# Patient Record
Sex: MALE | Race: BLACK OR AFRICAN AMERICAN | NOT HISPANIC OR LATINO | URBAN - METROPOLITAN AREA
[De-identification: names, ages, dates, MRNs, and addresses within clinical notes are randomized per-mention and may not be internally consistent; named-entity substitution may affect disease eponyms.]

---

## 2023-05-18 ENCOUNTER — INPATIENT (INPATIENT)
Facility: HOSPITAL | Age: 63
LOS: 2 days | Discharge: HOME | DRG: 65 | End: 2023-05-21
Attending: HOSPITALIST | Admitting: INTERNAL MEDICINE
Payer: COMMERCIAL

## 2023-05-18 VITALS
DIASTOLIC BLOOD PRESSURE: 99 MMHG | HEIGHT: 65 IN | TEMPERATURE: 99 F | HEART RATE: 95 BPM | RESPIRATION RATE: 18 BRPM | SYSTOLIC BLOOD PRESSURE: 155 MMHG | OXYGEN SATURATION: 96 % | WEIGHT: 181 LBS

## 2023-05-18 DIAGNOSIS — R29.810 FACIAL WEAKNESS: ICD-10-CM

## 2023-05-18 LAB
ALBUMIN SERPL ELPH-MCNC: 4.6 G/DL — SIGNIFICANT CHANGE UP (ref 3.3–5.2)
ALP SERPL-CCNC: 58 U/L — SIGNIFICANT CHANGE UP (ref 40–120)
ALT FLD-CCNC: 55 U/L — HIGH
ANION GAP SERPL CALC-SCNC: 12 MMOL/L — SIGNIFICANT CHANGE UP (ref 5–17)
APPEARANCE UR: CLEAR — SIGNIFICANT CHANGE UP
AST SERPL-CCNC: 56 U/L — HIGH
BASOPHILS # BLD AUTO: 0.01 K/UL — SIGNIFICANT CHANGE UP (ref 0–0.2)
BASOPHILS NFR BLD AUTO: 0.2 % — SIGNIFICANT CHANGE UP (ref 0–2)
BILIRUB SERPL-MCNC: 0.5 MG/DL — SIGNIFICANT CHANGE UP (ref 0.4–2)
BILIRUB UR-MCNC: NEGATIVE — SIGNIFICANT CHANGE UP
BLD GP AB SCN SERPL QL: SIGNIFICANT CHANGE UP
BUN SERPL-MCNC: 9.1 MG/DL — SIGNIFICANT CHANGE UP (ref 8–20)
CALCIUM SERPL-MCNC: 9.7 MG/DL — SIGNIFICANT CHANGE UP (ref 8.4–10.5)
CHLORIDE SERPL-SCNC: 99 MMOL/L — SIGNIFICANT CHANGE UP (ref 96–108)
CO2 SERPL-SCNC: 24 MMOL/L — SIGNIFICANT CHANGE UP (ref 22–29)
COLOR SPEC: YELLOW — SIGNIFICANT CHANGE UP
CREAT SERPL-MCNC: 0.91 MG/DL — SIGNIFICANT CHANGE UP (ref 0.5–1.3)
DIFF PNL FLD: NEGATIVE — SIGNIFICANT CHANGE UP
EGFR: 95 ML/MIN/1.73M2 — SIGNIFICANT CHANGE UP
EOSINOPHIL # BLD AUTO: 0.04 K/UL — SIGNIFICANT CHANGE UP (ref 0–0.5)
EOSINOPHIL NFR BLD AUTO: 0.9 % — SIGNIFICANT CHANGE UP (ref 0–6)
ERYTHROCYTE [SEDIMENTATION RATE] IN BLOOD: 20 MM/HR — SIGNIFICANT CHANGE UP (ref 0–20)
ETHANOL SERPL-MCNC: <10 MG/DL — SIGNIFICANT CHANGE UP (ref 0–9)
GLUCOSE SERPL-MCNC: 119 MG/DL — HIGH (ref 70–99)
GLUCOSE UR QL: NEGATIVE MG/DL — SIGNIFICANT CHANGE UP
HCT VFR BLD CALC: 40 % — SIGNIFICANT CHANGE UP (ref 39–50)
HGB BLD-MCNC: 13.8 G/DL — SIGNIFICANT CHANGE UP (ref 13–17)
IMM GRANULOCYTES NFR BLD AUTO: 0.2 % — SIGNIFICANT CHANGE UP (ref 0–0.9)
KETONES UR-MCNC: NEGATIVE — SIGNIFICANT CHANGE UP
LEUKOCYTE ESTERASE UR-ACNC: ABNORMAL
LYMPHOCYTES # BLD AUTO: 1.06 K/UL — SIGNIFICANT CHANGE UP (ref 1–3.3)
LYMPHOCYTES # BLD AUTO: 23.1 % — SIGNIFICANT CHANGE UP (ref 13–44)
MCHC RBC-ENTMCNC: 30.8 PG — SIGNIFICANT CHANGE UP (ref 27–34)
MCHC RBC-ENTMCNC: 34.5 GM/DL — SIGNIFICANT CHANGE UP (ref 32–36)
MCV RBC AUTO: 89.3 FL — SIGNIFICANT CHANGE UP (ref 80–100)
MONOCYTES # BLD AUTO: 0.41 K/UL — SIGNIFICANT CHANGE UP (ref 0–0.9)
MONOCYTES NFR BLD AUTO: 8.9 % — SIGNIFICANT CHANGE UP (ref 2–14)
NEUTROPHILS # BLD AUTO: 3.06 K/UL — SIGNIFICANT CHANGE UP (ref 1.8–7.4)
NEUTROPHILS NFR BLD AUTO: 66.7 % — SIGNIFICANT CHANGE UP (ref 43–77)
NITRITE UR-MCNC: NEGATIVE — SIGNIFICANT CHANGE UP
PH UR: 6.5 — SIGNIFICANT CHANGE UP (ref 5–8)
PLATELET # BLD AUTO: 203 K/UL — SIGNIFICANT CHANGE UP (ref 150–400)
POTASSIUM SERPL-MCNC: 3.8 MMOL/L — SIGNIFICANT CHANGE UP (ref 3.5–5.3)
POTASSIUM SERPL-SCNC: 3.8 MMOL/L — SIGNIFICANT CHANGE UP (ref 3.5–5.3)
PROT SERPL-MCNC: 8.2 G/DL — SIGNIFICANT CHANGE UP (ref 6.6–8.7)
PROT UR-MCNC: NEGATIVE — SIGNIFICANT CHANGE UP
RBC # BLD: 4.48 M/UL — SIGNIFICANT CHANGE UP (ref 4.2–5.8)
RBC # FLD: 11.4 % — SIGNIFICANT CHANGE UP (ref 10.3–14.5)
RBC CASTS # UR COMP ASSIST: NEGATIVE /HPF — SIGNIFICANT CHANGE UP (ref 0–4)
SODIUM SERPL-SCNC: 135 MMOL/L — SIGNIFICANT CHANGE UP (ref 135–145)
SP GR SPEC: 1.01 — SIGNIFICANT CHANGE UP (ref 1.01–1.02)
TROPONIN T SERPL-MCNC: <0.01 NG/ML — SIGNIFICANT CHANGE UP (ref 0–0.06)
UROBILINOGEN FLD QL: NEGATIVE MG/DL — SIGNIFICANT CHANGE UP
WBC # BLD: 4.59 K/UL — SIGNIFICANT CHANGE UP (ref 3.8–10.5)
WBC # FLD AUTO: 4.59 K/UL — SIGNIFICANT CHANGE UP (ref 3.8–10.5)
WBC UR QL: SIGNIFICANT CHANGE UP /HPF (ref 0–5)

## 2023-05-18 PROCEDURE — 72125 CT NECK SPINE W/O DYE: CPT | Mod: 26,MG

## 2023-05-18 PROCEDURE — 99223 1ST HOSP IP/OBS HIGH 75: CPT

## 2023-05-18 PROCEDURE — 70450 CT HEAD/BRAIN W/O DYE: CPT | Mod: 26,MG

## 2023-05-18 PROCEDURE — 93010 ELECTROCARDIOGRAM REPORT: CPT

## 2023-05-18 PROCEDURE — 93306 TTE W/DOPPLER COMPLETE: CPT | Mod: 26

## 2023-05-18 PROCEDURE — G1004: CPT

## 2023-05-18 PROCEDURE — 99285 EMERGENCY DEPT VISIT HI MDM: CPT

## 2023-05-18 PROCEDURE — 71045 X-RAY EXAM CHEST 1 VIEW: CPT | Mod: 26

## 2023-05-18 RX ORDER — ASPIRIN/CALCIUM CARB/MAGNESIUM 324 MG
81 TABLET ORAL DAILY
Refills: 0 | Status: DISCONTINUED | OUTPATIENT
Start: 2023-05-18 | End: 2023-05-21

## 2023-05-18 RX ORDER — THIAMINE MONONITRATE (VIT B1) 100 MG
100 TABLET ORAL DAILY
Refills: 0 | Status: DISCONTINUED | OUTPATIENT
Start: 2023-05-18 | End: 2023-05-21

## 2023-05-18 RX ORDER — ENOXAPARIN SODIUM 100 MG/ML
40 INJECTION SUBCUTANEOUS EVERY 24 HOURS
Refills: 0 | Status: DISCONTINUED | OUTPATIENT
Start: 2023-05-18 | End: 2023-05-21

## 2023-05-18 RX ORDER — MULTIVIT-MIN/FERROUS GLUCONATE 9 MG/15 ML
1 LIQUID (ML) ORAL DAILY
Refills: 0 | Status: DISCONTINUED | OUTPATIENT
Start: 2023-05-18 | End: 2023-05-21

## 2023-05-18 RX ORDER — ACETAMINOPHEN 500 MG
650 TABLET ORAL EVERY 6 HOURS
Refills: 0 | Status: DISCONTINUED | OUTPATIENT
Start: 2023-05-18 | End: 2023-05-21

## 2023-05-18 RX ORDER — ATORVASTATIN CALCIUM 80 MG/1
80 TABLET, FILM COATED ORAL AT BEDTIME
Refills: 0 | Status: DISCONTINUED | OUTPATIENT
Start: 2023-05-18 | End: 2023-05-21

## 2023-05-18 RX ORDER — FOLIC ACID 0.8 MG
1 TABLET ORAL DAILY
Refills: 0 | Status: DISCONTINUED | OUTPATIENT
Start: 2023-05-18 | End: 2023-05-21

## 2023-05-18 RX ADMIN — ENOXAPARIN SODIUM 40 MILLIGRAM(S): 100 INJECTION SUBCUTANEOUS at 18:29

## 2023-05-18 RX ADMIN — ATORVASTATIN CALCIUM 80 MILLIGRAM(S): 80 TABLET, FILM COATED ORAL at 21:33

## 2023-05-18 NOTE — ED PROVIDER NOTE - CLINICAL SUMMARY MEDICAL DECISION MAKING FREE TEXT BOX
62yoM; with PMH signif for Alcohol Abuse, TBI; now p/w new onset slurred speech and facial droop.  patient reports he awoke normal yesterday.  yesterday at work, someone stated he was not speaking well and face was drooped.  Stroke assessment found patient to have NIHSS=3.  ct with signs of ischemia. will admit for further w/up.

## 2023-05-18 NOTE — ED PROVIDER NOTE - OBJECTIVE STATEMENT
62yoM; with PMH signif for Alcohol Abuse, TBI; now p/w new onset slurred speech and facial droop.  patient reports he awoke normal yesterday.  yesterday at work, someone stated he was not speaking well and face was drooped.  patient denies headache. denies n/v. denies f/c/s.  denies cp/sob/palp. denies back pain.,    PMH: Alcohol Abuse, TBI  SOCIAL:+EtOH 6 beers daily

## 2023-05-18 NOTE — ED ADULT NURSE NOTE - OBJECTIVE STATEMENT
Pt with slurred speech and left sided facial droop that started more than 24hrs ago. Pt denies any other complaints.

## 2023-05-18 NOTE — ED ADULT TRIAGE NOTE - CHIEF COMPLAINT QUOTE
Pt LAURITA from doctors office, sent to ED for facial droop and slurred speech, onset 11am yesterday, no hx of stroke, pt offers no other complaints

## 2023-05-18 NOTE — DISCHARGE NOTE NURSING/CASE MANAGEMENT/SOCIAL WORK - NSDCPEFALRISK_GEN_ALL_CORE
For information on Fall & Injury Prevention, visit: [email or web address] OR  [email or web address] OR  https://www.cdc.gov/steadi/patient.html no...

## 2023-05-18 NOTE — PATIENT PROFILE ADULT - FALL HARM RISK - HARM RISK INTERVENTIONS
Assistance with ambulation/Assistance OOB with selected safe patient handling equipment/Communicate Risk of Fall with Harm to all staff/Discuss with provider need for PT consult/Monitor gait and stability/Reinforce activity limits and safety measures with patient and family/Tailored Fall Risk Interventions/Visual Cue: Yellow wristband and red socks/Bed in lowest position, wheels locked, appropriate side rails in place/Call bell, personal items and telephone in reach/Instruct patient to call for assistance before getting out of bed or chair/Non-slip footwear when patient is out of bed/[place] to call system/Physically safe environment - no spills, clutter or unnecessary equipment/Purposeful Proactive Rounding/Room/bathroom lighting operational, light cord in reach

## 2023-05-18 NOTE — ED PROVIDER NOTE - NS ED ROS FT
Constitutional: (-) fever  (-)chills  (-)sweats  Eyes/ENT: (-)   Cardiovascular: (-) chest pain, (-) palpitations (-) edema   Respiratory: (-) cough, (-) shortness of breath   Gastrointestinal: (-)nausea  (-)vomiting, (-) diarrhea  (-) abdominal pain   :  (-)dysuria, (-)frequency, (-)urgency, (-)hematuria  Musculoskeletal: (-) neck pain, (-) back pain, (-) joint pain  Integumentary: (-) rash, (-) edema  Neurological: (-) headache, (-) altered mental status  (-)LOC  +slurred speech +facial droop

## 2023-05-18 NOTE — ED ADULT NURSE NOTE - NSFALLUNIVINTERV_ED_ALL_ED
Bed/Stretcher in lowest position, wheels locked, appropriate side rails in place/Call bell, personal items and telephone in reach/Instruct patient to call for assistance before getting out of bed/chair/stretcher/Non-slip footwear applied when patient is off stretcher/[place] to call system/Physically safe environment - no spills, clutter or unnecessary equipment/Purposeful proactive rounding/Room/bathroom lighting operational, light cord in reach

## 2023-05-18 NOTE — ED PROVIDER NOTE - PHYSICAL EXAMINATION
Gen: Alert, NAD  Head: NC, AT, PERRL, EOMI, normal lids/conjunctiva  Neck: +supple, no tenderness/meningismus/JVD, +Trachea midline  Pulm: Bilateral BS, normal resp effort, no wheeze/stridor/retractions  CV: RRR, no M/R/G, 2+dist pulses  Abd: soft, NT/ND, +BS, no hepatosplenomegaly  Mskel: ROM intact x4 extremities.  no edema/erythema/cyanosis  Skin: no rash, warm, dry  Neuro: AAOx3, L facial droop, slurred speech, L UE drift (NIHSS=3)

## 2023-05-18 NOTE — H&P ADULT - ASSESSMENT
63 yo M w/ hx TBI, etoh use presents to ER for slurred speech and facial droop since wednesday at 11am.  States he lives in [place] and comes to [place] for seasonal work.  At work, coworker noticed his symptoms and was seen by MD at private office with Er referral. Last drink 2 days ago with rum. usually consumes 1-2 drinks, sometimes to help with insomnia. denies hx of withdrawals, DTs or heavy etoh use. Er workup negative thus far for acute pathology but requesting admission for MRI to r/o CVA    slurred speech/ facial droop    r/o CVA    MRI pending     neurology consulted    aspirin/statin    pt/ot/ speech    etoh abuse: ciwa, thiamine for suspect def   folate/MVI    transaminitis: trend    ppx: lovenox

## 2023-05-18 NOTE — DISCHARGE NOTE NURSING/CASE MANAGEMENT/SOCIAL WORK - PATIENT PORTAL LINK FT
You can access the FollowMyHealth Patient Portal offered by [facility] by registering at the following website: [email or web address]. By joining [employer]’s FollowMyHealth portal, you will also be able to view your health information using other applications (apps) compatible with our system.

## 2023-05-18 NOTE — H&P ADULT - HISTORY OF PRESENT ILLNESS
61 yo M w/ hx TBI, etoh use presents to ER for slurred speech and facial droop since wednesday at 11am.  States he lives in [place] and comes to [place] for seasonal work.  At work, coworker noticed his symptoms and was seen by MD at private office with Er referral. Last drink 2 days ago with rum. usually consumes 1-2 drinks, sometimes to help with insomnia. denies hx of withdrawals, DTs or heavy etoh use. Er workup negative thus far for acute pathology but requesting admission for MRI to r/o CVA

## 2023-05-19 LAB
A1C WITH ESTIMATED AVERAGE GLUCOSE RESULT: 5.5 % — SIGNIFICANT CHANGE UP (ref 4–5.6)
ANION GAP SERPL CALC-SCNC: 13 MMOL/L — SIGNIFICANT CHANGE UP (ref 5–17)
BUN SERPL-MCNC: 14.1 MG/DL — SIGNIFICANT CHANGE UP (ref 8–20)
CALCIUM SERPL-MCNC: 9.4 MG/DL — SIGNIFICANT CHANGE UP (ref 8.4–10.5)
CHLORIDE SERPL-SCNC: 100 MMOL/L — SIGNIFICANT CHANGE UP (ref 96–108)
CHOLEST SERPL-MCNC: 216 MG/DL — HIGH
CO2 SERPL-SCNC: 22 MMOL/L — SIGNIFICANT CHANGE UP (ref 22–29)
CREAT SERPL-MCNC: 0.88 MG/DL — SIGNIFICANT CHANGE UP (ref 0.5–1.3)
CULTURE RESULTS: SIGNIFICANT CHANGE UP
EGFR: 97 ML/MIN/1.73M2 — SIGNIFICANT CHANGE UP
ESTIMATED AVERAGE GLUCOSE: 111 MG/DL — SIGNIFICANT CHANGE UP (ref 68–114)
GLUCOSE SERPL-MCNC: 118 MG/DL — HIGH (ref 70–99)
HCV AB S/CO SERPL IA: 0.21 S/CO — SIGNIFICANT CHANGE UP (ref 0–0.99)
HCV AB SERPL-IMP: SIGNIFICANT CHANGE UP
HDLC SERPL-MCNC: 31 MG/DL — LOW
LIPID PNL WITH DIRECT LDL SERPL: 160 MG/DL — HIGH
NON HDL CHOLESTEROL: 185 MG/DL — HIGH
POTASSIUM SERPL-MCNC: 4.1 MMOL/L — SIGNIFICANT CHANGE UP (ref 3.5–5.3)
POTASSIUM SERPL-SCNC: 4.1 MMOL/L — SIGNIFICANT CHANGE UP (ref 3.5–5.3)
SODIUM SERPL-SCNC: 135 MMOL/L — SIGNIFICANT CHANGE UP (ref 135–145)
SPECIMEN SOURCE: SIGNIFICANT CHANGE UP
TRIGL SERPL-MCNC: 127 MG/DL — SIGNIFICANT CHANGE UP

## 2023-05-19 PROCEDURE — 70551 MRI BRAIN STEM W/O DYE: CPT | Mod: 26

## 2023-05-19 PROCEDURE — 99284 EMERGENCY DEPT VISIT MOD MDM: CPT

## 2023-05-19 PROCEDURE — 99233 SBSQ HOSP IP/OBS HIGH 50: CPT

## 2023-05-19 RX ADMIN — Medication 1 TABLET(S): at 11:44

## 2023-05-19 RX ADMIN — ENOXAPARIN SODIUM 40 MILLIGRAM(S): 100 INJECTION SUBCUTANEOUS at 18:53

## 2023-05-19 RX ADMIN — Medication 100 MILLIGRAM(S): at 11:44

## 2023-05-19 RX ADMIN — ATORVASTATIN CALCIUM 80 MILLIGRAM(S): 80 TABLET, FILM COATED ORAL at 21:28

## 2023-05-19 RX ADMIN — Medication 81 MILLIGRAM(S): at 11:45

## 2023-05-19 RX ADMIN — Medication 1 MILLIGRAM(S): at 11:44

## 2023-05-19 NOTE — SBIRT NOTE ADULT - NSSBIRTALCPOSREINDET_GEN_A_CORE
PATIENT REPORTS HE HAS 1 SHOT DAILY OF RUM HOWEVER THE DAY BEFORE THE STROKE HE DRANK HALF A BOTTLE. HE REPORTS HE WILL NOT DRINK ANY LONGER AFTER THE SCARE. REFUSING RESOURCES

## 2023-05-19 NOTE — PROGRESS NOTE ADULT - ASSESSMENT
63 yo M w/ hx TBI, etoh use presents to ER for slurred speech and facial droop since wednesday at 11am admitted for r/o CVA.     Acute CVA  -   - Aspirin 81mg daily   - Lipitor 80mg nightly  - CT head: CT HEAD:    Cerebral hemispheric white matter lesions such as ischemic white matter disease at the upper range typical for age and likely superimposed small deep white matter remote infarctions  - MRI Brain: Acute right basal ganglia infarction  - PT/OT consulted  - Neurology consult pending    EtOH Abuse  - [facility] protocol  - Thiamine   - MVI  - Folate    Transminitis  - Monitor     DVT ppx  - Lovenox SQ   61 yo M w/ hx TBI, etoh use presents to ER for slurred speech and facial droop since wednesday at 11am admitted for r/o CVA.     Acute CVA  -   - Aspirin 81mg daily   - Lipitor 80mg nightly  - CT head: Cerebral hemispheric white matter lesions such as ischemic white matter disease at the upper range typical for age and likely superimposed small deep white matter remote infarctions  - MRI Brain: Acute right basal ganglia infarction  - PT/OT consult appreciated  - Cardiology consult appreciated, outpatient f/u  - Neurology consult pending    EtOH Abuse  - [facility] protocol  - Thiamine   - MVI  - Folate    Transminitis  - Monitor     DVT ppx  - Lovenox SQ

## 2023-05-19 NOTE — OCCUPATIONAL THERAPY INITIAL EVALUATION ADULT - GENERAL OBSERVATIONS, REHAB EVAL
Received pt semifowler in bed, NAD, +IV lock, +on RA A&Ox4. Pre/post pain level 0/10. Pt agreeable to OT evaluation

## 2023-05-19 NOTE — PHYSICAL THERAPY INITIAL EVALUATION ADULT - PERTINENT HX OF CURRENT PROBLEM, REHAB EVAL
63 yo M w/ hx TBI, etoh use presents to ER for slurred speech and facial droop since wednesday at 11am admitted for r/o CVA.

## 2023-05-19 NOTE — PROGRESS NOTE ADULT - SUBJECTIVE AND OBJECTIVE BOX
Chief complaint: Slurred speech    Patient seen and examined at bedside. No acute overnight events reported. No fever, chills, cough, nausea or vomiting.     Vital Signs Last 24 Hrs  T(F): 98.3 (19 May 2023 08:07), Max: 98.7 (18 May 2023 11:22)  HR: 76 (19 May 2023 08:07) (68 - 95)  BP: 145/85 (19 May 2023 08:07) (131/82 - 166/92)  RR: 16 (19 May 2023 08:07) (16 - 19)  SpO2: 100% (19 May 2023 08:07) (95% - 100%)    Physical Exam:  Constitutional: alert and oriented, in no acute distress   Neck: Soft and supple  Respiratory: Good air entry b/l  Cardiovascular: Regular rate and rhythm  Gastrointestinal: Soft, non-tender to palpation, +bs  Vascular: 2+ peripheral pulses  Neurological: A/O x 3  Musculoskeletal: 5/5 strength b/l upper and lower extremities, no lower extremity edema bilaterally    Labs:  ]                      13.8   4.59  )-----------( 203      ( 18 May 2023 12:44 )             40.0   05-19    135  |  100  |  14.1  ----------------------------<  118<H>  4.1   |  22.0  |  0.88    Ca    9.4      19 May 2023 04:25    TPro  8.2  /  Alb  4.6  /  TBili  0.5  /  DBili  x   /  AST  56<H>  /  ALT  55<H>  /  AlkPhos  58  05-18   Chief complaint: Slurred speech    Patient seen and examined at bedside. No acute overnight events reported. No fever, chills, cough, nausea or vomiting.     Vital Signs Last 24 Hrs  T(F): 98.3 (19 May 2023 08:07), Max: 98.7 (18 May 2023 11:22)  HR: 76 (19 May 2023 08:07) (68 - 95)  BP: 145/85 (19 May 2023 08:07) (131/82 - 166/92)  RR: 16 (19 May 2023 08:07) (16 - 19)  SpO2: 100% (19 May 2023 08:07) (95% - 100%)    Physical Exam:  Constitutional: alert and oriented, in no acute distress   Neck: Soft and supple  Respiratory: Good air entry b/l  Cardiovascular: Regular rate and rhythm  Gastrointestinal: Soft, non-tender to palpation, +bs  Vascular: 2+ peripheral pulses  Neurological: A/O x 3, left sided facial droop  Musculoskeletal: 5/5 strength b/l upper and lower extremities, no lower extremity edema bilaterally    Labs:  ]                      13.8   4.59  )-----------( 203      ( 18 May 2023 12:44 )             40.0   05-19    135  |  100  |  14.1  ----------------------------<  118<H>  4.1   |  22.0  |  0.88    Ca    9.4      19 May 2023 04:25    TPro  8.2  /  Alb  4.6  /  TBili  0.5  /  DBili  x   /  AST  56<H>  /  ALT  55<H>  /  AlkPhos  58  05-18

## 2023-05-19 NOTE — OCCUPATIONAL THERAPY INITIAL EVALUATION ADULT - DIAGNOSIS, OT EVAL
62 year old Male with hx TBI, etoh use presents to ER for slurred speech and facial droop since Wednesday at 11am. Pt admitted for r/o CVA.

## 2023-05-19 NOTE — OCCUPATIONAL THERAPY INITIAL EVALUATION ADULT - ADDITIONAL COMMENTS
Pt has a shower with curtains and grab bar. Pt owns no DME. Pt is right handed. Pt amb without device and is independent with functional mobility, ADLs, and IADLs. Pt drives and is currently working. Pt has support of friends.

## 2023-05-19 NOTE — PHYSICAL THERAPY INITIAL EVALUATION ADULT - STANDING BALANCE: DYNAMIC, REHAB EVAL
pt c/o mid-abd pain, Nausea  x 2 days. pt stated didn't have a BM in 2 days, took anti gas meds with no relief. . h/o HTN
fair plus

## 2023-05-19 NOTE — CONSULT NOTE ADULT - SUBJECTIVE AND OBJECTIVE BOX
[facility] Stroke Team  Consult Note     Preliminary note, official note pending attending review/signature.     HPI:  63 yo M w/ hx TBI, etoh use presents to ER for slurred speech and facial droop since 5/17/23 at 11am. He states he lives in [place] and comes to [place] for seasonal work. At work, his coworker noticed his symptoms and was seen by MD at private office with ER referral. He reports that his last drink 2 days ago. He usually consumes 1-2 drinks, sometimes to help with insomnia. He denies hx of withdrawals, DTs or heavy etoh use. CTH showing cerebral white matter ischemic changes. MRI brain showing R internal capsule acute infarct.     Stroke risk factors:    PAST MEDICAL & SURGICAL HISTORY:  TBI (traumatic brain injury)  ETOH use    No significant past surgical history    MEDICATIONS (STANDING):  aspirin enteric coated 81 milliGRAM(s) Oral daily  atorvastatin 80 milliGRAM(s) Oral at bedtime  enoxaparin Injectable 40 milliGRAM(s) SubCutaneous every 24 hours  folic acid 1 milliGRAM(s) Oral daily  multivitamin/minerals 1 Tablet(s) Oral daily  thiamine 100 milliGRAM(s) Oral daily    MEDICATIONS  (PRN):  acetaminophen     Tablet .. 650 milliGRAM(s) Oral every 6 hours PRN Temp greater or equal to 38C (100.4F), Mild Pain (1 - 3), Moderate Pain (4 - 6)  LORazepam   Injectable 2 milliGRAM(s) IV Push every 1 hour PRN CIWA-Ar score 8 or greater    Allergies  No Known Allergies    Intolerances    SOCIAL HISTORY:  no tob,   no drugs    FAMILY HISTORY:  FH: hypertension    ROS: 14 point ROS negative other than what is present in HPI or below    Vital Signs Last 24 Hrs  T(C): 36.9 (19 May 2023 10:31), Max: 36.9 (19 May 2023 10:31)  T(F): 98.4 (19 May 2023 10:31), Max: 98.4 (19 May 2023 10:31)  HR: 99 (19 May 2023 10:31) (68 - 99)  BP: 157/93 (19 May 2023 10:31) (131/82 - 166/92)  BP(mean): --  RR: 16 (19 May 2023 10:31) (16 - 19)  SpO2: 97% (19 May 2023 10:31) (95% - 100%)    Parameters below as of 19 May 2023 10:31  Patient On (Oxygen Delivery Method): room air    Exam pending  General: NAD    Detailed Neurologic Exam:  Mental status: The patient is awake and alert and has normal attention span.  The patient is fully oriented in 3 spheres. The patient is oriented to current events. The patient is able to name objects, follow commands, repeat sentences.  Cranial nerves: Pupils equal and react symmetrically to light. There is no visual field deficit to confrontation. Extraocular motion is full with no nystagmus. There is no ptosis. Facial sensation is intact. Facial musculature is symmetric. Palate elevates symmetrically. Tongue is midline.  Motor: There is normal bulk and tone.  There is no tremor.  Strength is 5/5 in the right arm and leg.   Strength is 5/5 in the left arm and leg.  Sensation: Intact to light touch and pin in 4 extremities  Reflexes: 1-2+ throughout and plantar responses are flexor.  Cerebellar: There is no dysmetria on finger to nose testing.  Gait : deferred    [facility] SS:  DATE:  TIME:  1A: Level of consciousness (0-3):   1B: Questions (0-2):   1C: Commands (0-2):   2: Gaze (0-2):   3: Visual fields (0-3):   4: Facial palsy (0-3):   MOTOR:  5A: Left arm motor drift (0-4):   5B: Right arm motor drift (0-4):   6A: Left leg motor drift (0-4):   6B: Right leg motor drift (0-4):   7: Limb ataxia (0-2):   SENSORY:  8: Sensation (0-2):   SPEECH:  9: Language (0-3):   10: Dysarthria (0-2):   EXTINCTION:  11: Extinction/inattention (0-2):     TOTAL SCORE:     prehospital mRS=      LABS:                         13.8   4.59  )-----------( 203      ( 18 May 2023 12:44 )             40.0       05-19    135  |  100  |  14.1  ----------------------------<  118<H>  4.1   |  22.0  |  0.88    Ca    9.4      19 May 2023 04:25    TPro  8.2  /  Alb  4.6  /  TBili  0.5  /  DBili  x   /  AST  56<H>  /  ALT  55<H>  /  AlkPhos  58  05-18    Lipid panel: Total Chol 216,   HgbA1c: 5.5    RADIOLOGY & ADDITIONAL STUDIES (independently reviewed unless otherwise noted):  CT Head No Cont (05.18.23 @ 12:19)  CT HEAD:    Cerebral hemispheric white matter lesions such as   ischemic white matter disease at the upper range typical for age and   likely superimposed small deep white matter remote infarctions    CT Cervical Spine No Cont (05.18.23 @ 12:19)  CT CERVICAL:   No cervical vertebral fracture. Moderate cervical degenerative disc disease at C5-C6 and C6-C7    MR Head No Cont (05.19.23 @ 09:27)  IMPRESSION: Acute right basal ganglia infarction.    TTE 5.18.23   1. Left ventricular ejection fraction, by visual estimation, is 60 to   65%.   2. Normal global left ventricular systolic function.   3. Spectral Doppler shows impaired relaxation pattern of left   ventricular myocardial filling (Grade I diastolic dysfunction).   4. There is moderate concentric left ventricular hypertrophy.   5. Trace mitral valve regurgitation. [facility] Stroke Team  Consult Note     Preliminary note, official note pending attending review/signature.     HPI:  63 yo M w/ hx TBI, etoh use presents to ER for slurred speech and facial droop since 5/17/23 at 11am. He states he lives in [place] and comes to [place] for seasonal work. At work, his coworker noticed his symptoms and was seen by MD at private office with ER referral. He reports that his last drink 2 days ago. He usually consumes 1-2 drinks, sometimes to help with insomnia. He denies hx of withdrawals, DTs or heavy etoh use. CTH showing cerebral white matter ischemic changes. MRI brain showing R internal capsule acute infarct.     Stroke risk factors:    PAST MEDICAL & SURGICAL HISTORY:  TBI (traumatic brain injury)  ETOH use    No significant past surgical history    MEDICATIONS (STANDING):  aspirin enteric coated 81 milliGRAM(s) Oral daily  atorvastatin 80 milliGRAM(s) Oral at bedtime  enoxaparin Injectable 40 milliGRAM(s) SubCutaneous every 24 hours  folic acid 1 milliGRAM(s) Oral daily  multivitamin/minerals 1 Tablet(s) Oral daily  thiamine 100 milliGRAM(s) Oral daily    MEDICATIONS  (PRN):  acetaminophen     Tablet .. 650 milliGRAM(s) Oral every 6 hours PRN Temp greater or equal to 38C (100.4F), Mild Pain (1 - 3), Moderate Pain (4 - 6)  LORazepam   Injectable 2 milliGRAM(s) IV Push every 1 hour PRN CIWA-Ar score 8 or greater    Allergies  No Known Allergies    Intolerances    SOCIAL HISTORY:  no tob,   no drugs    FAMILY HISTORY:  FH: hypertension    ROS: 14 point ROS negative other than what is present in HPI or below    Vital Signs Last 24 Hrs  T(C): 36.9 (19 May 2023 10:31), Max: 36.9 (19 May 2023 10:31)  T(F): 98.4 (19 May 2023 10:31), Max: 98.4 (19 May 2023 10:31)  HR: 99 (19 May 2023 10:31) (68 - 99)  BP: 157/93 (19 May 2023 10:31) (131/82 - 166/92)  BP(mean): --  RR: 16 (19 May 2023 10:31) (16 - 19)  SpO2: 97% (19 May 2023 10:31) (95% - 100%)    Parameters below as of 19 May 2023 10:31  Patient On (Oxygen Delivery Method): room air    Exam pending  General: NAD    Detailed Neurologic Exam:  Mental status: The patient is awake and alert and has normal attention span.  The patient is fully oriented in 3 spheres. The patient is oriented to current events. The patient is able to name objects, follow commands, repeat sentences.  Cranial nerves: Speech dysarthric, Pupils equal and react symmetrically to light. There is no visual field deficit to confrontation. Extraocular motion is full with no nystagmus. There is no ptosis. Facial sensation is intact. L facial weakness, Palate elevates symmetrically. Tongue deviated to the left.  Motor: There is normal bulk and tone.  There is no tremor.  Strength is 5/5 in the right arm and leg.   Strength is 5/5 in the left arm and leg.  Sensation: Intact to light touch and pin in 4 extremities  Cerebellar: There is no dysmetria on finger to nose testing.  Gait : deferred    [facility] SS:  DATE: 5/19/23  TIME: 1400  1A: Level of consciousness (0-3): 0  1B: Questions (0-2): 0  1C: Commands (0-2): 0  2: Gaze (0-2): 0  3: Visual fields (0-3): 0  4: Facial palsy (0-3): 1  MOTOR:  5A: Left arm motor drift (0-4): 0  5B: Right arm motor drift (0-4): 0   6A: Left leg motor drift (0-4): 0  6B: Right leg motor drift (0-4): 0   7: Limb ataxia (0-2): 0  SENSORY:  8: Sensation (0-2): 0  SPEECH:  9: Language (0-3): 0  10: Dysarthria (0-2): 1   EXTINCTION:  11: Extinction/inattention (0-2): 0    TOTAL SCORE: 2    prehospital mRS=0    LABS:                         13.8   4.59  )-----------( 203      ( 18 May 2023 12:44 )             40.0       05-19    135  |  100  |  14.1  ----------------------------<  118<H>  4.1   |  22.0  |  0.88    Ca    9.4      19 May 2023 04:25    TPro  8.2  /  Alb  4.6  /  TBili  0.5  /  DBili  x   /  AST  56<H>  /  ALT  55<H>  /  AlkPhos  58  05-18    Lipid panel: Total Chol 216,   HgbA1c: 5.5    RADIOLOGY & ADDITIONAL STUDIES (independently reviewed unless otherwise noted):  CT Head No Cont (05.18.23 @ 12:19)  CT HEAD:    Cerebral hemispheric white matter lesions such as   ischemic white matter disease at the upper range typical for age and   likely superimposed small deep white matter remote infarctions    CT Cervical Spine No Cont (05.18.23 @ 12:19)  CT CERVICAL:   No cervical vertebral fracture. Moderate cervical degenerative disc disease at C5-C6 and C6-C7    MR Head No Cont (05.19.23 @ 09:27)  IMPRESSION: Acute right basal ganglia infarction.    TTE 5.18.23   1. Left ventricular ejection fraction, by visual estimation, is 60 to   65%.   2. Normal global left ventricular systolic function.   3. Spectral Doppler shows impaired relaxation pattern of left   ventricular myocardial filling (Grade I diastolic dysfunction).   4. There is moderate concentric left ventricular hypertrophy.   5. Trace mitral valve regurgitation. [facility] Stroke Team  Consult Note     HPI:  61 yo M w/ hx TBI, etoh use presents to ER for slurred speech and facial droop since 5/17/23 at 11am. He states he lives in [place] and comes to [place] for seasonal work. At work, his coworker noticed his symptoms and was seen by MD at private office with ER referral. He reports that his last drink 2 days ago. He usually consumes 1-2 drinks, sometimes to help with insomnia. He denies hx of withdrawals, DTs or heavy etoh use. CTH showing cerebral white matter ischemic changes. MRI brain showing R internal capsule acute infarct.     Stroke risk factors:    PAST MEDICAL & SURGICAL HISTORY:  TBI (traumatic brain injury)  ETOH use    No significant past surgical history    MEDICATIONS (STANDING):  aspirin enteric coated 81 milliGRAM(s) Oral daily  atorvastatin 80 milliGRAM(s) Oral at bedtime  enoxaparin Injectable 40 milliGRAM(s) SubCutaneous every 24 hours  folic acid 1 milliGRAM(s) Oral daily  multivitamin/minerals 1 Tablet(s) Oral daily  thiamine 100 milliGRAM(s) Oral daily    MEDICATIONS  (PRN):  acetaminophen     Tablet .. 650 milliGRAM(s) Oral every 6 hours PRN Temp greater or equal to 38C (100.4F), Mild Pain (1 - 3), Moderate Pain (4 - 6)  LORazepam   Injectable 2 milliGRAM(s) IV Push every 1 hour PRN CIWA-Ar score 8 or greater    Allergies  No Known Allergies    Intolerances    SOCIAL HISTORY:  no tob,   no drugs    FAMILY HISTORY:  FH: hypertension    ROS: 14 point ROS negative other than what is present in HPI or below    Vital Signs Last 24 Hrs  T(C): 36.9 (19 May 2023 10:31), Max: 36.9 (19 May 2023 10:31)  T(F): 98.4 (19 May 2023 10:31), Max: 98.4 (19 May 2023 10:31)  HR: 99 (19 May 2023 10:31) (68 - 99)  BP: 157/93 (19 May 2023 10:31) (131/82 - 166/92)  BP(mean): --  RR: 16 (19 May 2023 10:31) (16 - 19)  SpO2: 97% (19 May 2023 10:31) (95% - 100%)    Parameters below as of 19 May 2023 10:31  Patient On (Oxygen Delivery Method): room air    Exam pending  General: NAD    Detailed Neurologic Exam:  Mental status: The patient is awake and alert and has normal attention span.  The patient is fully oriented in 3 spheres. The patient is oriented to current events. The patient is able to name objects, follow commands, repeat sentences.  Cranial nerves: Speech dysarthric, Pupils equal and react symmetrically to light. There is no visual field deficit to confrontation. Extraocular motion is full with no nystagmus. There is no ptosis. Facial sensation is intact. L facial weakness, Palate elevates symmetrically. Tongue deviated to the left.  Motor: There is normal bulk and tone.  There is no tremor.  Strength is 5/5 in the right arm and leg.   Strength is 5/5 in the left arm and leg.  Sensation: Intact to light touch and pin in 4 extremities  Cerebellar: There is no dysmetria on finger to nose testing.  Gait : deferred    [facility] SS:  DATE: 5/19/23  TIME: 1400  1A: Level of consciousness (0-3): 0  1B: Questions (0-2): 0  1C: Commands (0-2): 0  2: Gaze (0-2): 0  3: Visual fields (0-3): 0  4: Facial palsy (0-3): 1  MOTOR:  5A: Left arm motor drift (0-4): 0  5B: Right arm motor drift (0-4): 0   6A: Left leg motor drift (0-4): 0  6B: Right leg motor drift (0-4): 0   7: Limb ataxia (0-2): 0  SENSORY:  8: Sensation (0-2): 0  SPEECH:  9: Language (0-3): 0  10: Dysarthria (0-2): 1   EXTINCTION:  11: Extinction/inattention (0-2): 0    TOTAL SCORE: 2    prehospital mRS=0    LABS:                         13.8   4.59  )-----------( 203      ( 18 May 2023 12:44 )             40.0       05-19    135  |  100  |  14.1  ----------------------------<  118<H>  4.1   |  22.0  |  0.88    Ca    9.4      19 May 2023 04:25    TPro  8.2  /  Alb  4.6  /  TBili  0.5  /  DBili  x   /  AST  56<H>  /  ALT  55<H>  /  AlkPhos  58  05-18    Lipid panel: Total Chol 216,   HgbA1c: 5.5    RADIOLOGY & ADDITIONAL STUDIES (independently reviewed unless otherwise noted):  CT Head No Cont (05.18.23 @ 12:19)  CT HEAD:    Cerebral hemispheric white matter lesions such as   ischemic white matter disease at the upper range typical for age and   likely superimposed small deep white matter remote infarctions    CT Cervical Spine No Cont (05.18.23 @ 12:19)  CT CERVICAL:   No cervical vertebral fracture. Moderate cervical degenerative disc disease at C5-C6 and C6-C7    MR Head No Cont (05.19.23 @ 09:27)  IMPRESSION: Acute right basal ganglia infarction.    TTE 5.18.23   1. Left ventricular ejection fraction, by visual estimation, is 60 to   65%.   2. Normal global left ventricular systolic function.   3. Spectral Doppler shows impaired relaxation pattern of left   ventricular myocardial filling (Grade I diastolic dysfunction).   4. There is moderate concentric left ventricular hypertrophy.   5. Trace mitral valve regurgitation.

## 2023-05-19 NOTE — CONSULT NOTE ADULT - ASSESSMENT
ASSESSMENT: 63 yo M w/ hx TBI, etoh use presents to ER for slurred speech and facial droop since 5/17/23 at 11am. CTH showing cerebral white matter ischemic changes. MRI brain showing R internal capsule acute infarct.     NEURO: Continue close monitoring for neurologic deterioration, permissive HTN, titrate statin to LDL goal less than 70, MRI Brain w/o, MRA Head w/o and Neck w/contrast. Physical therapy/OT/Speech eval/treatment.     ANTITHROMBOTIC THERAPY: Continue aspirin 81 mg daily.    PULMONARY: CXR clear, protecting airway, saturating well     CARDIOVASCULAR: TTE as noted above - would add bubble study to evaluate for PFO; cardiac monitoring                              SBP goal: normotension; avoid rapid fluctuations in blood pressure    GASTROINTESTINAL:  dysphagia screen - passed     Diet: Regular     RENAL: BUN/Cr 14.1/0.88     Na Goal: Greater than 135     Forbes: N    HEMATOLOGY: H/H 13.8/40.0, Platelets 203     DVT ppx: Heparin s.c [] LMWH [x]     ID: afebrile, no leukocytosis     DISPOSITION: Rehab or home depending on PT eval once stable and workup is complete    CORE MEASURES:        Admission NIHSS:      Tenecteplase : [] YES [x] NO      LDL/HDL/A1C: 160/31/5.5     Depression Screen: Pending     Statin Therapy: Atorvastatin 80 mg      Dysphagia Screen: [x] PASS [] FAIL     Smoking [] YES [x] NO      Afib [] YES [x] NO     Stroke Education [] YES [x] NO pending    Obtain screening lower extremity venous ultrasound in patients who meet 1 or more of the following criteria as patient is high risk for DVT/PE on admission:   [] History of DVT/PE  []Hypercoagulable states (Factor V Leiden, Cancer, OCP, etc. )  []Prolonged immobility (hemiplegia/hemiparesis/post operative or any other extended immobilization)  [] Transferred from outside facility (Rehab or Long term care)  [] Age </= to 50 ASSESSMENT: 63 yo M w/ hx TBI, etoh use presents to ER for slurred speech and facial droop since 5/17/23 at 11am. CTH showing cerebral white matter ischemic changes. MRI brain showing R internal capsule acute infarct.     NEURO: Continue close monitoring for neurologic deterioration, Patient tolerating BP at less than 160 systolic, avoid rapid fluctuations, BP goal <180 for now, titrate statin to LDL goal less than 70, MRA Head w/o and Neck w/contrast. Physical therapy/OT/Speech eval/treatment.     ANTITHROMBOTIC THERAPY: Continue ASA 81 mg daily. Add plavix 75 mg daily for dual antiplatelet therapy pending vessel imaging.    PULMONARY: CXR clear, protecting airway, saturating well     CARDIOVASCULAR: TTE as noted above - would add bubble study to evaluate for PFO; cardiac monitoring                              SBP goal: <160 systolic; avoid rapid fluctuations in blood pressure    GASTROINTESTINAL:  dysphagia screen - passed     Diet: Regular     RENAL: BUN/Cr 14.1/0.88     Na Goal: Greater than 135     Forbes: N    HEMATOLOGY: H/H 13.8/40.0, Platelets 203     DVT ppx: Heparin s.c [] LMWH [x]     ID: afebrile, no leukocytosis     DISPOSITION: Rehab or home depending on PT eval once stable and workup is complete    CORE MEASURES:        Admission NIHSS:      Tenecteplase : [] YES [x] NO      LDL/HDL/A1C: 160/31/5.5     Depression Screen: Pending     Statin Therapy: Atorvastatin 80 mg      Dysphagia Screen: [x] PASS [] FAIL     Smoking [] YES [x] NO      Afib [] YES [x] NO     Stroke Education [] YES [x] NO pending    Obtain screening lower extremity venous ultrasound in patients who meet 1 or more of the following criteria as patient is high risk for DVT/PE on admission:   [] History of DVT/PE  []Hypercoagulable states (Factor V Leiden, Cancer, OCP, etc. )  []Prolonged immobility (hemiplegia/hemiparesis/post operative or any other extended immobilization)  [] Transferred from outside facility (Rehab or Long term care)  [] Age </= to 50 ASSESSMENT: 61 yo M w/ hx TBI, etoh use presents to ER for slurred speech and facial droop since 5/17/23 at 11am. CTH showing cerebral white matter ischemic changes. MRI brain showing R internal capsule acute infarct.     NEURO: Continue close monitoring for neurologic deterioration, Patient tolerating BP at less than 160 systolic, avoid rapid fluctuations, titrate statin to LDL goal less than 70, MRA Head w/o and Neck w/contrast. Physical therapy/OT/Speech eval/treatment.     ANTITHROMBOTIC THERAPY: Continue ASA 81 mg daily. Add plavix 75 mg daily for dual antiplatelet therapy pending vessel imaging.    PULMONARY: CXR clear, protecting airway, saturating well     CARDIOVASCULAR: TTE as noted above - would add bubble study to evaluate for PFO; cardiac monitoring                              SBP goal: <160 systolic; avoid rapid fluctuations in blood pressure    GASTROINTESTINAL:  dysphagia screen - passed     Diet: Regular     RENAL: BUN/Cr 14.1/0.88     Na Goal: Greater than 135     Forbes: N    HEMATOLOGY: H/H 13.8/40.0, Platelets 203     DVT ppx: Heparin s.c [] LMWH [x]     ID: afebrile, no leukocytosis     DISPOSITION: Rehab or home depending on PT eval once stable and workup is complete    CORE MEASURES:        Admission NIHSS:      Tenecteplase : [] YES [x] NO      LDL/HDL/A1C: 160/31/5.5     Depression Screen: Pending     Statin Therapy: Atorvastatin 80 mg      Dysphagia Screen: [x] PASS [] FAIL     Smoking [] YES [x] NO      Afib [] YES [x] NO     Stroke Education [] YES [x] NO pending    Obtain screening lower extremity venous ultrasound in patients who meet 1 or more of the following criteria as patient is high risk for DVT/PE on admission:   [] History of DVT/PE  []Hypercoagulable states (Factor V Leiden, Cancer, OCP, etc. )  []Prolonged immobility (hemiplegia/hemiparesis/post operative or any other extended immobilization)  [] Transferred from outside facility (Rehab or Long term care)  [] Age </= to 50

## 2023-05-19 NOTE — CONSULT NOTE ADULT - SUBJECTIVE AND OBJECTIVE BOX
[facility]                                      [facility]                              [address] [place] [address]                                                 PHONE: [phone]                                                 FAX: [phone]  ------------------------------------------------------------------------------------------------    Chief complaint: slurred speech    62y Male with past medical history as under presented to ER for slurred speech and facial droop since wednesday at 11am.  States he lives in [place] and comes to [place] for seasonal work.  At work, coworker noticed his symptoms and was seen by MD at private office with Er referral. Last drink 2 days ago with rum. usually consumes 1-2 drinks, sometimes to help with insomnia. MRI Brain acute right basal ganglia infarction  At the time of evaluation, pt reports feeling well.     PAST MEDICAL & SURGICAL HISTORY:  TBI (traumatic brain injury)      No significant past surgical history    No Known Allergies    Vital Signs Last 24 Hrs  T(C): 36.9 (19 May 2023 10:31), Max: 37.1 (18 May 2023 11:22)  T(F): 98.4 (19 May 2023 10:31), Max: 98.7 (18 May 2023 11:22)  HR: 99 (19 May 2023 10:31) (68 - 99)  BP: 157/93 (19 May 2023 10:31) (131/82 - 166/92)  BP(mean): --  RR: 16 (19 May 2023 10:31) (16 - 19)  SpO2: 97% (19 May 2023 10:31) (95% - 100%)    Parameters below as of 19 May 2023 10:31  Patient On (Oxygen Delivery Method): room air    PHYSICAL EXAM:  Cardiovascular: regular S1, S2  Respiratory: Lungs clear to auscultation; no crepitations, no wheeze  Musculoskeletal: No edema    LABS:                        13.8   4.59  )-----------( 203      ( 18 May 2023 12:44 )             40.0     05-19    135  |  100  |  14.1  ----------------------------<  118<H>  4.1   |  22.0  |  0.88    Ca    9.4      19 May 2023 04:25    TPro  8.2  /  Alb  4.6  /  TBili  0.5  /  DBili  x   /  AST  56<H>  /  ALT  55<H>  /  AlkPhos  58  05-18    CARDIAC MARKERS ( 18 May 2023 12:44 )  x     / <0.01 ng/mL / x     / x     / x        RADIOLOGY & ADDITIONAL STUDIES: (reviewed)  CXR was independently visualized/reviewed and demonstrated: clear lungs    CARDIOLOGY TESTING:(reviewed)     ECG (Independent visualization): NSR with LAD    ECHOCARDIOGRAM :   1. Left ventricular ejection fraction, by visual estimation, is 60 to   65%.   2. Normal global left ventricular systolic function.   3. Spectral Doppler shows impaired relaxation pattern of left   ventricular myocardial filling (Grade I diastolic dysfunction).   4. There is moderate concentric left ventricular hypertrophy.   5. Trace mitral valve regurgitation.    TELEMETRY independently visualized/reviewed and demonstrated : NSR    MEDICATIONS:(reviewed)  Home Medications:  Home Medications:    MEDICATIONS  (STANDING):  aspirin enteric coated 81 milliGRAM(s) Oral daily  atorvastatin 80 milliGRAM(s) Oral at bedtime  enoxaparin Injectable 40 milliGRAM(s) SubCutaneous every 24 hours  folic acid 1 milliGRAM(s) Oral daily  multivitamin/minerals 1 Tablet(s) Oral daily  thiamine 100 milliGRAM(s) Oral daily    MEDICATIONS  (PRN):  acetaminophen     Tablet .. 650 milliGRAM(s) Oral every 6 hours PRN Temp greater or equal to 38C (100.4F), Mild Pain (1 - 3), Moderate Pain (4 - 6)  LORazepam   Injectable 2 milliGRAM(s) IV Push every 1 hour PRN CIWA-Ar score 8 or greater    ASSESSMENT AND PLAN:    62y Male with prior history of alcohol use on no meds presented with presented to ER for slurred speech and facial droop. MRI Brain acute right basal ganglia infarction.    CVA  - Continue ASA and statin  - Echo with normal LV function and no AF noted so far  - Neuro evaluation pending  - No further inpt cardiac work-up needed. Pls recall if any qns/concerns. Will arrange outpt FU post discharge.    Plan discussed with Dr. Roth

## 2023-05-19 NOTE — PHYSICAL THERAPY INITIAL EVALUATION ADULT - ADDITIONAL COMMENTS
Pt reports living in [place], however comes to [place] for seasonal work. Pt is currently living alone in a house with 1 DEBB[address] c no rail. Pt amb without device and is independent with functional mobility, ADLs, and IADLs. Pt drives and is currently working. Pt has support of friends. Pt has GB in shower.

## 2023-05-20 LAB
ANION GAP SERPL CALC-SCNC: 12 MMOL/L — SIGNIFICANT CHANGE UP (ref 5–17)
BASOPHILS # BLD AUTO: 0.02 K/UL — SIGNIFICANT CHANGE UP (ref 0–0.2)
BASOPHILS NFR BLD AUTO: 0.5 % — SIGNIFICANT CHANGE UP (ref 0–2)
BUN SERPL-MCNC: 17.3 MG/DL — SIGNIFICANT CHANGE UP (ref 8–20)
CALCIUM SERPL-MCNC: 9.1 MG/DL — SIGNIFICANT CHANGE UP (ref 8.4–10.5)
CHLORIDE SERPL-SCNC: 99 MMOL/L — SIGNIFICANT CHANGE UP (ref 96–108)
CO2 SERPL-SCNC: 22 MMOL/L — SIGNIFICANT CHANGE UP (ref 22–29)
CREAT SERPL-MCNC: 0.8 MG/DL — SIGNIFICANT CHANGE UP (ref 0.5–1.3)
EGFR: 99 ML/MIN/1.73M2 — SIGNIFICANT CHANGE UP
EOSINOPHIL # BLD AUTO: 0.23 K/UL — SIGNIFICANT CHANGE UP (ref 0–0.5)
EOSINOPHIL NFR BLD AUTO: 5.3 % — SIGNIFICANT CHANGE UP (ref 0–6)
GLUCOSE SERPL-MCNC: 112 MG/DL — HIGH (ref 70–99)
HCT VFR BLD CALC: 41 % — SIGNIFICANT CHANGE UP (ref 39–50)
HGB BLD-MCNC: 13.7 G/DL — SIGNIFICANT CHANGE UP (ref 13–17)
IMM GRANULOCYTES NFR BLD AUTO: 0.2 % — SIGNIFICANT CHANGE UP (ref 0–0.9)
LYMPHOCYTES # BLD AUTO: 1.7 K/UL — SIGNIFICANT CHANGE UP (ref 1–3.3)
LYMPHOCYTES # BLD AUTO: 39.4 % — SIGNIFICANT CHANGE UP (ref 13–44)
MCHC RBC-ENTMCNC: 31.1 PG — SIGNIFICANT CHANGE UP (ref 27–34)
MCHC RBC-ENTMCNC: 33.4 GM/DL — SIGNIFICANT CHANGE UP (ref 32–36)
MCV RBC AUTO: 93.2 FL — SIGNIFICANT CHANGE UP (ref 80–100)
MONOCYTES # BLD AUTO: 0.57 K/UL — SIGNIFICANT CHANGE UP (ref 0–0.9)
MONOCYTES NFR BLD AUTO: 13.2 % — SIGNIFICANT CHANGE UP (ref 2–14)
NEUTROPHILS # BLD AUTO: 1.79 K/UL — LOW (ref 1.8–7.4)
NEUTROPHILS NFR BLD AUTO: 41.4 % — LOW (ref 43–77)
PLATELET # BLD AUTO: 212 K/UL — SIGNIFICANT CHANGE UP (ref 150–400)
POTASSIUM SERPL-MCNC: 3.9 MMOL/L — SIGNIFICANT CHANGE UP (ref 3.5–5.3)
POTASSIUM SERPL-SCNC: 3.9 MMOL/L — SIGNIFICANT CHANGE UP (ref 3.5–5.3)
RBC # BLD: 4.4 M/UL — SIGNIFICANT CHANGE UP (ref 4.2–5.8)
RBC # FLD: 11.6 % — SIGNIFICANT CHANGE UP (ref 10.3–14.5)
SODIUM SERPL-SCNC: 133 MMOL/L — LOW (ref 135–145)
WBC # BLD: 4.32 K/UL — SIGNIFICANT CHANGE UP (ref 3.8–10.5)
WBC # FLD AUTO: 4.32 K/UL — SIGNIFICANT CHANGE UP (ref 3.8–10.5)

## 2023-05-20 PROCEDURE — 99232 SBSQ HOSP IP/OBS MODERATE 35: CPT

## 2023-05-20 PROCEDURE — 70498 CT ANGIOGRAPHY NECK: CPT | Mod: 26

## 2023-05-20 PROCEDURE — 70496 CT ANGIOGRAPHY HEAD: CPT | Mod: 26

## 2023-05-20 RX ADMIN — ATORVASTATIN CALCIUM 80 MILLIGRAM(S): 80 TABLET, FILM COATED ORAL at 21:01

## 2023-05-20 RX ADMIN — Medication 1 MILLIGRAM(S): at 09:19

## 2023-05-20 RX ADMIN — Medication 81 MILLIGRAM(S): at 09:18

## 2023-05-20 RX ADMIN — ENOXAPARIN SODIUM 40 MILLIGRAM(S): 100 INJECTION SUBCUTANEOUS at 17:06

## 2023-05-20 RX ADMIN — Medication 100 MILLIGRAM(S): at 09:19

## 2023-05-20 RX ADMIN — Medication 1 TABLET(S): at 09:19

## 2023-05-20 NOTE — PROGRESS NOTE ADULT - SUBJECTIVE AND OBJECTIVE BOX
MARICEL HUGGINS  ----------------------------------------  The patient was seen at bedside. Patient with stroke. Offered no complaints. Still with some dysarthria.    Vital Signs Last 24 Hrs  T(C): 36.8 (20 May 2023 07:20), Max: 36.9 (19 May 2023 15:30)  T(F): 98.2 (20 May 2023 07:20), Max: 98.5 (19 May 2023 15:30)  HR: 92 (20 May 2023 10:00) (79 - 92)  BP: 147/97 (20 May 2023 10:00) (139/83 - 158/78)  BP(mean): 104 (20 May 2023 10:00) (97 - 105)  RR: 18 (20 May 2023 10:00) (16 - 24)  SpO2: 100% (20 May 2023 10:00) (95% - 100%)    Parameters below as of 20 May 2023 10:00  Patient On (Oxygen Delivery Method): room air    PHYSICAL EXAMINATION:  ----------------------------------------  General appearance: No acute distress, Awake, Alert  HEENT: Normocephalic, Atraumatic, Conjunctiva clear, EOMI  Neck: Supple, No JVD, No tenderness  Lungs: Breath sound equal bilaterally, No wheezes, No rales  Cardiovascular: S1S2, Regular rhythm  Abdomen: Soft, Nontender, Nondistended, No guarding/rebound, Positive bowel sounds  Extremities: No clubbing, No cyanosis, No edema, No calf tenderness  Neuro: Strength equal bilaterally, No tremors, Left facial droop  Psychiatric: Appropriate mood, Normal affect    LABORATORY STUDIES:  ----------------------------------------             13.7   4.32  )-----------( 212      ( 20 May 2023 06:26 )             41.0     05-20    133<L>  |  99  |  17.3  ----------------------------<  112<H>  3.9   |  22.0  |  0.80    Ca    9.1      20 May 2023 06:26    TPro  8.2  /  Alb  4.6  /  TBili  0.5  /  DBili  x   /  AST  56<H>  /  ALT  55<H>  /  AlkPhos  58  05-18    LIVER FUNCTIONS - ( 18 May 2023 12:44 )  Alb: 4.6 g/dL / Pro: 8.2 g/dL / ALK PHOS: 58 U/L / ALT: 55 U/L / AST: 56 U/L / GGT: x           CARDIAC MARKERS ( 18 May 2023 12:44 )  x     / <0.01 ng/mL / x     / x     / x        Urinalysis Basic - ( 18 May 2023 13:51 )  Color: Yellow / Appearance: Clear / S.010 / pH: x  Gluc: x / Ketone: Negative  / Bili: Negative / Urobili: Negative mg/dL   Blood: x / Protein: Negative / Nitrite: Negative   Leuk Esterase: Trace / RBC: Negative /HPF / WBC 0-2 /HPF   Sq Epi: x / Non Sq Epi: x / Bacteria: x    Culture - Urine (collected 18 May 2023 13:51)  Source: Clean Catch Clean Catch (Midstream)  Final Report (19 May 2023 14:18):    <10,000 CFU/mL Normal Urogenital Elke    MEDICATIONS  (STANDING):  aspirin enteric coated 81 milliGRAM(s) Oral daily  atorvastatin 80 milliGRAM(s) Oral at bedtime  enoxaparin Injectable 40 milliGRAM(s) SubCutaneous every 24 hours  folic acid 1 milliGRAM(s) Oral daily  multivitamin/minerals 1 Tablet(s) Oral daily  thiamine 100 milliGRAM(s) Oral daily    MEDICATIONS  (PRN):  acetaminophen     Tablet .. 650 milliGRAM(s) Oral every 6 hours PRN Temp greater or equal to 38C (100.4F), Mild Pain (1 - 3), Moderate Pain (4 - 6)  LORazepam   Injectable 2 milliGRAM(s) IV Push every 1 hour PRN CIWA-Ar score 8 or greater      ASSESSMENT / PLAN:  ----------------------------------------  62M who presented with slurred speech and facial droop found to have a stroke.    Stroke - CT and MRI results were discussed with the patient. On aspirin and atorvastatin. Discussed with Neurology, for CT angiogram to further evaluate. Continue to monitor status with serial neurological examinations.    History of alcohol abuse - Monitoring for signs of alcohol withdrawal. On thiamine for suspected thiamine deficiency.    Transaminitis - Mild elevation in AST/ALT. To monitor. MARICEL HUGGINS  ----------------------------------------  The patient was seen at bedside. Patient with stroke. Offered no complaints. Still with some dysarthria.    Vital Signs Last 24 Hrs  T(C): 36.8 (20 May 2023 07:20), Max: 36.9 (19 May 2023 15:30)  T(F): 98.2 (20 May 2023 07:20), Max: 98.5 (19 May 2023 15:30)  HR: 92 (20 May 2023 10:00) (79 - 92)  BP: 147/97 (20 May 2023 10:00) (139/83 - 158/78)  BP(mean): 104 (20 May 2023 10:00) (97 - 105)  RR: 18 (20 May 2023 10:00) (16 - 24)  SpO2: 100% (20 May 2023 10:00) (95% - 100%)    Parameters below as of 20 May 2023 10:00  Patient On (Oxygen Delivery Method): room air    PHYSICAL EXAMINATION:  ----------------------------------------  General appearance: No acute distress, Awake, Alert  HEENT: Normocephalic, Atraumatic, Conjunctiva clear, EOMI  Neck: Supple, No JVD, No tenderness  Lungs: Breath sound equal bilaterally, No wheezes, No rales  Cardiovascular: S1S2, Regular rhythm  Abdomen: Soft, Nontender, Nondistended, No guarding/rebound, Positive bowel sounds  Extremities: No clubbing, No cyanosis, No edema, No calf tenderness  Neuro: Strength equal bilaterally, No tremors, Left facial droop  Psychiatric: Appropriate mood, Normal affect    LABORATORY STUDIES:  ----------------------------------------             13.7   4.32  )-----------( 212      ( 20 May 2023 06:26 )             41.0     05-20    133<L>  |  99  |  17.3  ----------------------------<  112<H>  3.9   |  22.0  |  0.80    Ca    9.1      20 May 2023 06:26    TPro  8.2  /  Alb  4.6  /  TBili  0.5  /  DBili  x   /  AST  56<H>  /  ALT  55<H>  /  AlkPhos  58  05-18    LIVER FUNCTIONS - ( 18 May 2023 12:44 )  Alb: 4.6 g/dL / Pro: 8.2 g/dL / ALK PHOS: 58 U/L / ALT: 55 U/L / AST: 56 U/L / GGT: x           CARDIAC MARKERS ( 18 May 2023 12:44 )  x     / <0.01 ng/mL / x     / x     / x        Urinalysis Basic - ( 18 May 2023 13:51 )  Color: Yellow / Appearance: Clear / S.010 / pH: x  Gluc: x / Ketone: Negative  / Bili: Negative / Urobili: Negative mg/dL   Blood: x / Protein: Negative / Nitrite: Negative   Leuk Esterase: Trace / RBC: Negative /HPF / WBC 0-2 /HPF   Sq Epi: x / Non Sq Epi: x / Bacteria: x    Culture - Urine (collected 18 May 2023 13:51)  Source: Clean Catch Clean Catch (Midstream)  Final Report (19 May 2023 14:18):    <10,000 CFU/mL Normal Urogenital Elke    MEDICATIONS  (STANDING):  aspirin enteric coated 81 milliGRAM(s) Oral daily  atorvastatin 80 milliGRAM(s) Oral at bedtime  enoxaparin Injectable 40 milliGRAM(s) SubCutaneous every 24 hours  folic acid 1 milliGRAM(s) Oral daily  multivitamin/minerals 1 Tablet(s) Oral daily  thiamine 100 milliGRAM(s) Oral daily    MEDICATIONS  (PRN):  acetaminophen     Tablet .. 650 milliGRAM(s) Oral every 6 hours PRN Temp greater or equal to 38C (100.4F), Mild Pain (1 - 3), Moderate Pain (4 - 6)  LORazepam   Injectable 2 milliGRAM(s) IV Push every 1 hour PRN CIWA-Ar score 8 or greater      ASSESSMENT / PLAN:  ----------------------------------------  62M who presented with slurred speech and facial droop found to have a stroke.    Stroke - CT and MRI results were discussed with the patient. On aspirin and atorvastatin. Discussed with Neurology, for CT angiogram to further evaluate. Continue to monitor status with serial neurological examinations.    Hyperlipidemia - LDL was 160. On atorvastatin. Lifestyle changes discussed.    History of alcohol abuse - Monitoring for signs of alcohol withdrawal. On thiamine for suspected thiamine deficiency.    Transaminitis - Mild elevation in AST/ALT. To monitor.

## 2023-05-21 VITALS
SYSTOLIC BLOOD PRESSURE: 139 MMHG | HEART RATE: 81 BPM | DIASTOLIC BLOOD PRESSURE: 93 MMHG | RESPIRATION RATE: 16 BRPM | OXYGEN SATURATION: 99 %

## 2023-05-21 LAB
ALBUMIN SERPL ELPH-MCNC: 4.1 G/DL — SIGNIFICANT CHANGE UP (ref 3.3–5.2)
ALP SERPL-CCNC: 56 U/L — SIGNIFICANT CHANGE UP (ref 40–120)
ALT FLD-CCNC: 61 U/L — HIGH
ANION GAP SERPL CALC-SCNC: 13 MMOL/L — SIGNIFICANT CHANGE UP (ref 5–17)
AST SERPL-CCNC: 45 U/L — HIGH
BILIRUB SERPL-MCNC: 0.4 MG/DL — SIGNIFICANT CHANGE UP (ref 0.4–2)
BUN SERPL-MCNC: 20.5 MG/DL — HIGH (ref 8–20)
CALCIUM SERPL-MCNC: 9.4 MG/DL — SIGNIFICANT CHANGE UP (ref 8.4–10.5)
CHLORIDE SERPL-SCNC: 100 MMOL/L — SIGNIFICANT CHANGE UP (ref 96–108)
CO2 SERPL-SCNC: 24 MMOL/L — SIGNIFICANT CHANGE UP (ref 22–29)
CREAT SERPL-MCNC: 0.96 MG/DL — SIGNIFICANT CHANGE UP (ref 0.5–1.3)
EGFR: 89 ML/MIN/1.73M2 — SIGNIFICANT CHANGE UP
GLUCOSE SERPL-MCNC: 127 MG/DL — HIGH (ref 70–99)
POTASSIUM SERPL-MCNC: 4.5 MMOL/L — SIGNIFICANT CHANGE UP (ref 3.5–5.3)
POTASSIUM SERPL-SCNC: 4.5 MMOL/L — SIGNIFICANT CHANGE UP (ref 3.5–5.3)
PROT SERPL-MCNC: 7.8 G/DL — SIGNIFICANT CHANGE UP (ref 6.6–8.7)
SODIUM SERPL-SCNC: 137 MMOL/L — SIGNIFICANT CHANGE UP (ref 135–145)

## 2023-05-21 PROCEDURE — 80053 COMPREHEN METABOLIC PANEL: CPT

## 2023-05-21 PROCEDURE — 86850 RBC ANTIBODY SCREEN: CPT

## 2023-05-21 PROCEDURE — 86901 BLOOD TYPING SEROLOGIC RH(D): CPT

## 2023-05-21 PROCEDURE — 81001 URINALYSIS AUTO W/SCOPE: CPT

## 2023-05-21 PROCEDURE — C8929: CPT

## 2023-05-21 PROCEDURE — 86803 HEPATITIS C AB TEST: CPT

## 2023-05-21 PROCEDURE — 70496 CT ANGIOGRAPHY HEAD: CPT

## 2023-05-21 PROCEDURE — 80061 LIPID PANEL: CPT

## 2023-05-21 PROCEDURE — 87086 URINE CULTURE/COLONY COUNT: CPT

## 2023-05-21 PROCEDURE — 99239 HOSP IP/OBS DSCHRG MGMT >30: CPT

## 2023-05-21 PROCEDURE — 99232 SBSQ HOSP IP/OBS MODERATE 35: CPT

## 2023-05-21 PROCEDURE — 82962 GLUCOSE BLOOD TEST: CPT

## 2023-05-21 PROCEDURE — 86900 BLOOD TYPING SEROLOGIC ABO: CPT

## 2023-05-21 PROCEDURE — 70450 CT HEAD/BRAIN W/O DYE: CPT | Mod: MG

## 2023-05-21 PROCEDURE — 70498 CT ANGIOGRAPHY NECK: CPT

## 2023-05-21 PROCEDURE — 36415 COLL VENOUS BLD VENIPUNCTURE: CPT

## 2023-05-21 PROCEDURE — 93005 ELECTROCARDIOGRAM TRACING: CPT

## 2023-05-21 PROCEDURE — 99285 EMERGENCY DEPT VISIT HI MDM: CPT | Mod: 25

## 2023-05-21 PROCEDURE — 71045 X-RAY EXAM CHEST 1 VIEW: CPT

## 2023-05-21 PROCEDURE — 83036 HEMOGLOBIN GLYCOSYLATED A1C: CPT

## 2023-05-21 PROCEDURE — 84484 ASSAY OF TROPONIN QUANT: CPT

## 2023-05-21 PROCEDURE — 85025 COMPLETE CBC W/AUTO DIFF WBC: CPT

## 2023-05-21 PROCEDURE — 85652 RBC SED RATE AUTOMATED: CPT

## 2023-05-21 PROCEDURE — G1004: CPT

## 2023-05-21 PROCEDURE — 80307 DRUG TEST PRSMV CHEM ANLYZR: CPT

## 2023-05-21 PROCEDURE — 72125 CT NECK SPINE W/O DYE: CPT | Mod: MG

## 2023-05-21 PROCEDURE — 80048 BASIC METABOLIC PNL TOTAL CA: CPT

## 2023-05-21 PROCEDURE — 70551 MRI BRAIN STEM W/O DYE: CPT

## 2023-05-21 RX ORDER — ATORVASTATIN CALCIUM 80 MG/1
1 TABLET, FILM COATED ORAL
Qty: 30 | Refills: 0
Start: 2023-05-21 | End: 2023-06-19

## 2023-05-21 RX ORDER — ASPIRIN/CALCIUM CARB/MAGNESIUM 324 MG
1 TABLET ORAL
Qty: 0 | Refills: 0 | DISCHARGE
Start: 2023-05-21

## 2023-05-21 RX ADMIN — Medication 1 TABLET(S): at 12:12

## 2023-05-21 RX ADMIN — Medication 100 MILLIGRAM(S): at 12:12

## 2023-05-21 RX ADMIN — Medication 1 MILLIGRAM(S): at 12:12

## 2023-05-21 RX ADMIN — Medication 81 MILLIGRAM(S): at 12:12

## 2023-05-21 NOTE — PROGRESS NOTE ADULT - SUBJECTIVE AND OBJECTIVE BOX
[facility] Stroke Team      HPI:  61 yo M w/ hx TBI, etoh use presents to ER for slurred speech and facial droop since 5/17/23 at 11am. He states he lives in [place] and comes to [place] for seasonal work. At work, his coworker noticed his symptoms and was seen by MD at private office with ER referral. He reports that his last drink 2 days ago. He usually consumes 1-2 drinks, sometimes to help with insomnia. He denies hx of withdrawals, DTs or heavy etoh use. CTH showing cerebral white matter ischemic changes. MRI brain showing R internal capsule acute infarct.     Stroke risk factors:    PAST MEDICAL & SURGICAL HISTORY:  TBI (traumatic brain injury)  ETOH use    No significant past surgical history    MEDICATIONS (STANDING):  aspirin enteric coated 81 milliGRAM(s) Oral daily  atorvastatin 80 milliGRAM(s) Oral at bedtime  enoxaparin Injectable 40 milliGRAM(s) SubCutaneous every 24 hours  folic acid 1 milliGRAM(s) Oral daily  multivitamin/minerals 1 Tablet(s) Oral daily  thiamine 100 milliGRAM(s) Oral daily    MEDICATIONS  (PRN):  acetaminophen     Tablet .. 650 milliGRAM(s) Oral every 6 hours PRN Temp greater or equal to 38C (100.4F), Mild Pain (1 - 3), Moderate Pain (4 - 6)  LORazepam   Injectable 2 milliGRAM(s) IV Push every 1 hour PRN CIWA-Ar score 8 or greater    Allergies  No Known Allergies    Intolerances    SOCIAL HISTORY:  no tob,   no drugs    FAMILY HISTORY:  FH: hypertension.        Vital Signs Last 24 Hrs  T(C): 36.8 (21 May 2023 07:07), Max: 36.8 (21 May 2023 07:07)  T(F): 98.2 (21 May 2023 07:07), Max: 98.2 (21 May 2023 07:07)  HR: 81 (21 May 2023 08:00) (81 - 84)  BP: 139/93 (21 May 2023 08:00) (124/64 - 146/87)  BP(mean): 104 (21 May 2023 08:00) (84 - 107)  RR: 16 (21 May 2023 08:00) (16 - 18)  SpO2: 99% (21 May 2023 08:00) (98% - 99%)    Parameters below as of 21 May 2023 08:00  Patient On (Oxygen Delivery Method): room air        MEDICATIONS    acetaminophen     Tablet .. 650 milliGRAM(s) Oral every 6 hours PRN  aspirin enteric coated 81 milliGRAM(s) Oral daily  atorvastatin 80 milliGRAM(s) Oral at bedtime  enoxaparin Injectable 40 milliGRAM(s) SubCutaneous every 24 hours  folic acid 1 milliGRAM(s) Oral daily  LORazepam   Injectable 2 milliGRAM(s) IV Push every 1 hour PRN  multivitamin/minerals 1 Tablet(s) Oral daily  thiamine 100 milliGRAM(s) Oral daily         LABS:  CBC Full  -  ( 20 May 2023 06:26 )  WBC Count : 4.32 K/uL  RBC Count : 4.40 M/uL  Hemoglobin : 13.7 g/dL  Hematocrit : 41.0 %  Platelet Count - Automated : 212 K/uL  Mean Cell Volume : 93.2 fl  Mean Cell Hemoglobin : 31.1 pg  Mean Cell Hemoglobin Concentration : 33.4 gm/dL  Auto Neutrophil # : 1.79 K/uL  Auto Lymphocyte # : 1.70 K/uL  Auto Monocyte # : 0.57 K/uL  Auto Eosinophil # : 0.23 K/uL  Auto Basophil # : 0.02 K/uL  Auto Neutrophil % : 41.4 %  Auto Lymphocyte % : 39.4 %  Auto Monocyte % : 13.2 %  Auto Eosinophil % : 5.3 %  Auto Basophil % : 0.5 %      05-21    137  |  100  |  20.5<H>  ----------------------------<  127<H>  4.5   |  24.0  |  0.96    Ca    9.4      21 May 2023 04:36    TPro  7.8  /  Alb  4.1  /  TBili  0.4  /  DBili  x   /  AST  45<H>  /  ALT  61<H>  /  AlkPhos  56  05-21    LIVER FUNCTIONS - ( 21 May 2023 04:36 )  Alb: 4.1 g/dL / Pro: 7.8 g/dL / ALK PHOS: 56 U/L / ALT: 61 U/L / AST: 45 U/L / GGT: x           Hemoglobin A1C:         Exam   General: NAD    Detailed Neurologic Exam:  Mental status: The patient is awake and alert and has normal attention span.  The patient is fully oriented in 3 spheres. The patient is oriented to current events. The patient is able to name objects, follow commands, repeat sentences.  Cranial nerves: Speech dysarthric, Pupils equal and react symmetrically to light. There is no visual field deficit to confrontation. Extraocular motion is full with no nystagmus. There is no ptosis. Facial sensation is intact. L facial weakness, Palate elevates symmetrically. Tongue deviated to the left.  Motor: There is normal bulk and tone.  There is no tremor.  Strength is 5/5 in the right arm and leg.   Strength is 5/5 in the left arm and leg.  Sensation: Intact to light touch and pin in 4 extremities  Cerebellar: There is no dysmetria on finger to nose testing.  Gait : deferred    prehospital mRS=0    RADIOLOGY & ADDITIONAL STUDIES (independently reviewed unless otherwise noted):  CT Head No Cont (05.18.23 @ 12:19)  CT HEAD:    Cerebral hemispheric white matter lesions such as   ischemic white matter disease at the upper range typical for age and   likely superimposed small deep white matter remote infarctions    CT Cervical Spine No Cont (05.18.23 @ 12:19)  CT CERVICAL:   No cervical vertebral fracture. Moderate cervical degenerative disc disease at C5-C6 and C6-C7    MR Head No Cont (05.19.23 @ 09:27)  IMPRESSION: Acute right basal ganglia infarction.    TTE 5.18.23   1. Left ventricular ejection fraction, by visual estimation, is 60 to   65%.   2. Normal global left ventricular systolic function.   3. Spectral Doppler shows impaired relaxation pattern of left   ventricular myocardial filling (Grade I diastolic dysfunction).   4. There is moderate concentric left ventricular hypertrophy.   5. Trace mitral valve regurgitation.

## 2023-05-21 NOTE — PROGRESS NOTE ADULT - SUBJECTIVE AND OBJECTIVE BOX
MARICEL HUGGINS  ----------------------------------------  The patient was seen at bedside. Patient with dysarthria. Offered no complaints. Some dysarthria.    Vital Signs Last 24 Hrs  T(C): 36.8 (21 May 2023 07:07), Max: 37.5 (20 May 2023 15:36)  T(F): 98.2 (21 May 2023 07:07), Max: 99.5 (20 May 2023 15:36)  HR: 81 (21 May 2023 08:00) (77 - 90)  BP: 139/93 (21 May 2023 08:00) (124/64 - 153/83)  BP(mean): 104 (21 May 2023 08:00) (84 - 107)  RR: 16 (21 May 2023 08:00) (16 - 18)  SpO2: 99% (21 May 2023 08:00) (98% - 99%)    Parameters below as of 21 May 2023 08:00  Patient On (Oxygen Delivery Method): room air    PHYSICAL EXAMINATION:  ----------------------------------------  General appearance: No acute distress, Awake, Alert  HEENT: Normocephalic, Atraumatic, Conjunctiva clear, EOMI  Neck: Supple, No JVD, No tenderness  Lungs: Breath sound equal bilaterally, No wheezes, No rales  Cardiovascular: S1S2, Regular rhythm  Abdomen: Soft, Nontender, Nondistended, No guarding/rebound, Positive bowel sounds  Extremities: No clubbing, No cyanosis, No edema, No calf tenderness  Neuro: Strength equal bilaterally, No tremors, Left facial droop  Psychiatric: Appropriate mood, Normal affect    LABORATORY STUDIES:  ----------------------------------------             13.7   4.32  )-----------( 212      ( 20 May 2023 06:26 )             41.0     05-21    137  |  100  |  20.5<H>  ----------------------------<  127<H>  4.5   |  24.0  |  0.96    Ca    9.4      21 May 2023 04:36    TPro  7.8  /  Alb  4.1  /  TBili  0.4  /  DBili  x   /  AST  45<H>  /  ALT  61<H>  /  AlkPhos  56  05-21    LIVER FUNCTIONS - ( 21 May 2023 04:36 )  Alb: 4.1 g/dL / Pro: 7.8 g/dL / ALK PHOS: 56 U/L / ALT: 61 U/L / AST: 45 U/L / GGT: x           05-20-23 @ 07:01  -  05-21-23 @ 07:00  --------------------------------------------------------  IN: 120 mL / OUT: 725 mL / NET: -605 mL    Culture - Urine (collected 18 May 2023 13:51)  Source: Clean Catch Clean Catch (Midstream)  Final Report (19 May 2023 14:18):    <10,000 CFU/mL Normal Urogenital Elke    MEDICATIONS  (STANDING):  aspirin enteric coated 81 milliGRAM(s) Oral daily  atorvastatin 80 milliGRAM(s) Oral at bedtime  enoxaparin Injectable 40 milliGRAM(s) SubCutaneous every 24 hours  folic acid 1 milliGRAM(s) Oral daily  multivitamin/minerals 1 Tablet(s) Oral daily  thiamine 100 milliGRAM(s) Oral daily    MEDICATIONS  (PRN):  acetaminophen     Tablet .. 650 milliGRAM(s) Oral every 6 hours PRN Temp greater or equal to 38C (100.4F), Mild Pain (1 - 3), Moderate Pain (4 - 6)  LORazepam   Injectable 2 milliGRAM(s) IV Push every 1 hour PRN CIWA-Ar score 8 or greater      ASSESSMENT / PLAN:  ----------------------------------------  62M who presented with slurred speech and facial droop found to have a stroke.    Stroke - CT and MRI results were discussed with the patient. CT angiogram completed. On aspirin and atorvastatin. Discussed with Neurology, for outpatient management.    Hyperlipidemia - LDL was 160. On atorvastatin. Lifestyle changes previously discussed.    History of alcohol abuse - No signs of alcohol withdrawal. On thiamine for suspected thiamine deficiency.    Transaminitis - Mild elevation in AST/ALT. Stable.

## 2023-05-21 NOTE — PROGRESS NOTE ADULT - ASSESSMENT
ASSESSMENT: 63 yo M w/ hx TBI, etoh use presents to ER for slurred speech and facial droop since 5/17/23 at 11am. CTH showing cerebral white matter ischemic changes. MRI brain showing R internal capsule acute infarct.     NEURO: Continue close monitoring for neurologic deterioration, Patient tolerating BP at less than 160 systolic, avoid rapid fluctuations, titrate statin to LDL goal less than 70, MRA Head w/o and Neck w/contrast. Physical therapy/OT/Speech eval/treatment.     ANTITHROMBOTIC THERAPY: Continue ASA 81 mg daily. Add plavix 75 mg daily for dual antiplatelet therapy pending vessel imaging.    PULMONARY: CXR clear, protecting airway, saturating well     CARDIOVASCULAR: TTE as noted above - would add bubble study to evaluate for PFO; cardiac monitoring                              SBP goal: <160 systolic; avoid rapid fluctuations in blood pressure    GASTROINTESTINAL:  dysphagia screen - passed     Diet: Regular     RENAL: BUN/Cr 14.1/0.88     Na Goal: Greater than 135     Forbes: N    HEMATOLOGY: H/H 13.8/40.0, Platelets 203     DVT ppx: Heparin s.c [] LMWH [x]     ID: afebrile, no leukocytosis     DISPOSITION: Rehab or home depending on PT eval once stable and workup is complete    CORE MEASURES:        Admission NIHSS:      Tenecteplase : [] YES [x] NO      LDL/HDL/A1C: 160/31/5.5     Depression Screen: Pending     Statin Therapy: Atorvastatin 80 mg      Dysphagia Screen: [x] PASS [] FAIL     Smoking [] YES [x] NO      Afib [] YES [x] NO     Stroke Education [] YES [x] NO pending    Obtain screening lower extremity venous ultrasound in patients who meet 1 or more of the following criteria as patient is high risk for DVT/PE on admission:   [] History of DVT/PE  []Hypercoagulable states (Factor V Leiden, Cancer, OCP, etc. )  []Prolonged immobility (hemiplegia/hemiparesis/post operative or any other extended immobilization)  [] Transferred from outside facility (Rehab or Long term care)  [] Age </= to 50

## 2023-05-21 NOTE — DISCHARGE NOTE PROVIDER - NSDCCPCAREPLAN_GEN_ALL_CORE_FT
PRINCIPAL DISCHARGE DIAGNOSIS  Diagnosis: Stroke  Assessment and Plan of Treatment: Continue on aspirin and atorvastatin. Follow up with Neurology and Cardiology for further management. Physical Therapy.

## 2023-05-21 NOTE — DISCHARGE NOTE PROVIDER - CARE PROVIDERS DIRECT ADDRESSES
[email or web address]

## 2023-05-21 NOTE — DISCHARGE NOTE PROVIDER - HOSPITAL COURSE
62M referred to the hospital by his primary care physician with a one day history of slurred speech and facial droop. On presentation, AST/ALT(56/55). CT of the head noted no acute cerebral cortical infarct, intracranial hemorrhage, or mass effect. CT of the cervical spine noted no cervical vertebral fracture, noted moderate cervical degenerative disc disease at C5-C6 and C6-C7. Echocardiogram noted normal global left ventricular systolic function, ejection fraction of 60 to 65%, moderate concentric left ventricular hypertrophy, MRI of the brain noted an acute right basal ganglia infarction. The patient was seen by Cardiology in consultation with recommendations for further outpatient management. The patient was seen by Neurology with recommendations to continued close monitoring of neurological symptoms. CT angiogram of the head and neck noted a stable acute right posterior internal capsule limb infarction, stable congenital retrocerebellar cyst, moderate chronic microvascular ischemic changes, no new acute transcortical infarction, no mass, mass effect, midline shift, or extra-axial fluid collection. chronic left orbital floor and medial wall fracture deformities. no large vessel occlusion or high-grade stenosis. The patient was instructed to follow up with Neurology and Cardiology for further management.      35 minutes total time

## 2023-05-21 NOTE — DISCHARGE NOTE PROVIDER - CARE PROVIDER_API CALL
Shivam Montalvo; PhD)  Neurology; Vascular Neurology  [address] [facility] [address], [address]  [place] [address]  Phone: [phone]  Fax: [phone]  Follow Up Time:     Logan Vera; MPH)  Cardiology; Internal Medicine  [address] [place] [address]  Phone: [phone]  Fax: [phone]  Follow Up Time:

## 2023-09-01 NOTE — OCCUPATIONAL THERAPY INITIAL EVALUATION ADULT - REHAB POTENTIAL, OT EVAL
Patient demonstrates independence with ADLs and functional transfers/none Stage 3 chronic kidney disease

## 2024-08-14 NOTE — ED ADULT NURSE NOTE - NEURO BEHAVIOR
icterus  Ears:  Normal  Nose:  Normal  Mouth:  Mucous membranes moist  Respiratory: Lungs CTA.  No accessory muscle use.  Heart:  Regular rhythm  Abdomen:  Soft.  Non tender.  Non distended.  Musculoskeletal:  No abnormal movements. ROM extremities normal.  Skin:  No rashes.    Lesion    Location:    R neck   Pigmented:   Yes   Diameter:  3 cm   Crusting: absent             Neurologic:  No focal deficits.  Psychiatric:  AAA. O x 3.            ASSESSMENT:  1. Sebaceous cyst            PLAN:  The diagnosis and recommended procedure were explained.  Questions answered.  Prepare for surgery to remove symptomatic and enlarging cyst.      calm